# Patient Record
Sex: FEMALE | Race: WHITE | URBAN - METROPOLITAN AREA
[De-identification: names, ages, dates, MRNs, and addresses within clinical notes are randomized per-mention and may not be internally consistent; named-entity substitution may affect disease eponyms.]

---

## 2017-02-01 ENCOUNTER — TRANSFERRED RECORDS (OUTPATIENT)
Dept: HEALTH INFORMATION MANAGEMENT | Facility: CLINIC | Age: 27
End: 2017-02-01

## 2017-02-01 LAB — PAP SMEAR - HIM PATIENT REPORTED: NEGATIVE

## 2018-11-08 ENCOUNTER — OFFICE VISIT (OUTPATIENT)
Dept: FAMILY MEDICINE | Facility: CLINIC | Age: 28
End: 2018-11-08
Payer: COMMERCIAL

## 2018-11-08 VITALS
WEIGHT: 148 LBS | HEIGHT: 66 IN | HEART RATE: 97 BPM | OXYGEN SATURATION: 99 % | TEMPERATURE: 97.1 F | SYSTOLIC BLOOD PRESSURE: 124 MMHG | DIASTOLIC BLOOD PRESSURE: 70 MMHG | BODY MASS INDEX: 23.78 KG/M2

## 2018-11-08 DIAGNOSIS — L72.0 EPIDERMAL CYST: Primary | ICD-10-CM

## 2018-11-08 PROCEDURE — 99202 OFFICE O/P NEW SF 15 MIN: CPT | Performed by: FAMILY MEDICINE

## 2018-11-08 NOTE — PROGRESS NOTES
"  SUBJECTIVE:   Shelley Downs is a 28 year old female who presents to clinic today for the following health issues:      Check bumps on head  Check mole right Axillary    Patient comes in today for a initial visit.  She has a history of having multiple cysts removed from her scalp, all of which were benign.  1 of those recurred and she wanted to have that one removed plus another one has appeared.  In addition she has a mole in her right axilla that she would like removed.    She stated she was thinking about having these biopsied.  I told her this was probably something that did not need to be done and that during the removal they could assess whether this would be necessary or not.  I made no judgment about the mole and actually did not examined today.    Patient states she has no chronic medical problems    No current outpatient prescriptions on file.     She has allergies to sulfa    She states family history is benign     ROS: 10 point ROS neg other than the symptoms noted above in the HPI.      Objective: /70 (BP Location: Right arm, Patient Position: Sitting, Cuff Size: Adult Regular)  Pulse 97  Temp 97.1  F (36.2  C) (Oral)  Ht 5' 5.5\" (1.664 m)  Wt 148 lb (67.1 kg)  SpO2 99%  Breastfeeding? No  BMI 24.25 kg/m2    Patient has 2 small cysts present in her scalp    These appear well defined and nontender, with no signs of drainage or infection    As stated mole not checked      ICD-10-CM    1. Epidermal cyst L72.0        I did have Dr. Sherman come in the office to evaluate and he agreed to remove these on a different visit.  40 minutes was scheduled for removal  Reviewed and updated as needed this visit by clinical staff       Reviewed and updated as needed this visit by Provider         "

## 2018-11-08 NOTE — MR AVS SNAPSHOT
After Visit Summary   11/8/2018    Shelley Downs    MRN: [id number]           Patient Information     Date Of Birth          1990        Visit Information        Provider Department      11/8/2018 8:40 AM Arben Galvez MD [facility]        Today's Diagnoses     Epidermal cyst    -  1       Follow-ups after your visit        Your next 10 appointments already scheduled     Nov 12, 2018  9:00 AM CST   [facility] Visit with María Sherman MD   [facility]    [address] [place] [id number] [phone]           Bring a current list of meds and any records pertaining to this visit. For Physicals, please bring immunization records and any forms needing to be filled out. Please arrive 10 minutes early to complete paperwork.            Nov 16, 2018 11:00 AM CST   PHYSICAL with Lauren Anneliese Engelmann, MD   [facility]    [address] [place] [id number] [phone]              Who to contact     If you have questions or need follow up information about today's clinic visit or your schedule please contact [facility] directly at [phone].  Normal or non-critical lab and imaging results will be communicated to you by MyChart, letter or phone within 4 business days after the clinic has received the results. If you do not hear from us within 7 days, please contact the clinic through [employer] or phone. If you have a critical or abnormal lab result, we will notify you by phone as soon as possible.  Submit refill requests through [employer] or call your pharmacy and they will forward the refill request to us. Please allow 3 business days for your refill to be completed.          Additional Information About Your Visit        PR[employer] Information     [employer] lets you send messages  "to your doctor, view your test results, renew your prescriptions, schedule appointments and more. To sign up, go to [email or web address] . Click on \"Log in\" on the left side of the screen, which will take you to the Welcome page. Then click on \"Sign up Now\" on the right side of the page.     You will be asked to enter the access code listed below, as well as some personal information. Please follow the directions to create your username and password.     Your access code is: [email or web address]  Expires: 2019  9:26 AM     Your access code will  in 90 days. If you need help or a new code, please call your [place] clinic or [phone].        Care EveryWhere ID     This is your Care EveryWhere ID. This could be used by other organizations to access your [facility] medical records  [id number]        Your Vitals Were     Pulse Temperature Height Pulse Oximetry Breastfeeding? BMI (Body Mass Index)    97 97.1  F (36.2  C) (Oral) 5' 5.5\" (1.664 m) 99% No 24.25 kg/m2       Blood Pressure from Last 3 Encounters:   18 124/70    Weight from Last 3 Encounters:   18 148 lb (67.1 kg)              Today, you had the following     No orders found for display       Primary Care Provider Fax #    Physician No Ref-Primary [phone]       No address on file        Equal Access to Services     [facility] : Hadii anand ku hadasho Soomaali, waaxda luqadaha, qaybta kaalmada adeegyada, emery dai . So [facility] [phone].    ATENCIÓN: Si habla español, tiene a duckworth disposición servicios gratuitos de asistencia lingüística. Llame al [phone].    We comply with applicable federal civil rights laws and [place] laws. We do not discriminate on the basis of race, color, national origin, age, disability, sex, sexual orientation, or gender identity.            Thank you!     Thank you for choosing [facility]  for your care. Our goal is always to provide you with " excellent care. Hearing back from our patients is one way we can continue to improve our services. Please take a few minutes to complete the written survey that you may receive in the mail after your visit with us. Thank you!             Your Updated Medication List - Protect others around you: Learn how to safely use, store and throw away your medicines at www.disposemymeds.org.      Notice  As of 11/8/2018  9:26 AM    You have not been prescribed any medications.

## 2018-11-15 ENCOUNTER — OFFICE VISIT (OUTPATIENT)
Dept: FAMILY MEDICINE | Facility: CLINIC | Age: 28
End: 2018-11-15
Payer: COMMERCIAL

## 2018-11-15 VITALS
SYSTOLIC BLOOD PRESSURE: 102 MMHG | DIASTOLIC BLOOD PRESSURE: 64 MMHG | BODY MASS INDEX: 24.58 KG/M2 | HEART RATE: 76 BPM | TEMPERATURE: 97.3 F | WEIGHT: 150 LBS | OXYGEN SATURATION: 100 %

## 2018-11-15 DIAGNOSIS — M67.40 GANGLION CYST: Primary | ICD-10-CM

## 2018-11-15 PROCEDURE — 11420 EXC H-F-NK-SP B9+MARG 0.5/<: CPT | Performed by: FAMILY MEDICINE

## 2018-11-15 ASSESSMENT — PAIN SCALES - GENERAL: PAINLEVEL: NO PAIN (0)

## 2018-11-15 NOTE — PROGRESS NOTES
SUBJECTIVE:                                                    Shelley Downs is a 28 year old female who presents to clinic today for the following health issues:  Patient is here for cyst removal  Patient comes in today to have a benign cyst removed.  She reports been having for a few months now and has been getting larger in size.   It's at the right occipital area.  She reports sometimes it does interfere with washing and combing her hair.    She has no drainage and is not tender.  She reports she had a few removed in the past    Problem list and histories reviewed & adjusted, as indicated.  Additional history: as documented    There is no problem list on file for this patient.    History reviewed. No pertinent surgical history.    Social History   Substance Use Topics     Smoking status: Former Smoker     Smokeless tobacco: Never Used     Alcohol use Yes      Comment: Occas     History reviewed. No pertinent family history.      No current outpatient prescriptions on file.       ROS:  Constitutional, HEENT, cardiovascular, pulmonary, gi and gu systems are negative, except as otherwise noted.    OBJECTIVE:     /64 (BP Location: Right arm, Patient Position: Chair, Cuff Size: Adult Regular)  Pulse 76  Temp 97.3  F (36.3  C) (Oral)  Wt 150 lb (68 kg)  SpO2 100%  Breastfeeding? No  BMI 24.58 kg/m2  Body mass index is 24.58 kg/(m^2).  GENERAL: healthy, alert and no distress  SKIN: right occipital area, 3 mm by 6 mm of beingin cyst.    Diagnostic Test Results:  none     ASSESSMENT/PLAN:       ICD-10-CM    1. Ganglion cyst M67.40 EXC BENIGN SKIN LESION SCLP/NCK/HNDS/FEET/GEN <=0.5 CM     CANCELED: EXC BENIGN SKIN LESION SCLP/NCK/HNDS/FEET/GEN 1.1-2.0 CM     After a explained the procedure for the patient.  And I obtained written and signed consent.    The area of the area of the cyst was cleaned initially with alcohol swabs and Betadine 3 times and alcohol again.  I did use sterile technique.  And I  did numb the area using 3 cc of 1% lidocaine.    Then I used scalpel and made a horizontal incision.  Used a needle handler with a tweezers and I was able to remove the cyst completely with the complete sac intact.  Afterwards I did use 1 stitch, 4 - ethlion stitch.   There was minimum bleeding.    She was given instruction to keep it dry and clean.  She will follow-up in 1 week to have the stitch removed  Patient Instructions   After care instructions:  Keep wound clean and dry for the next 24-48 hours  Signs of infection discussed today  Apply anti-bacterial ointment for 5- 7 days, 2-3X per day.  May return to work as long as wound is kept clean and dry  Discussed the probability of scarring  Active range of motion exercises encouraged.      Follow up in one week        María Sherman MD  [facility]

## 2018-11-15 NOTE — PATIENT INSTRUCTIONS
After care instructions:  Keep wound clean and dry for the next 24-48 hours  Signs of infection discussed today  Apply anti-bacterial ointment for 5- 7 days, 2-3X per day.  May return to work as long as wound is kept clean and dry  Discussed the probability of scarring  Active range of motion exercises encouraged.      Follow up in one week

## 2018-11-23 ENCOUNTER — OFFICE VISIT (OUTPATIENT)
Dept: FAMILY MEDICINE | Facility: CLINIC | Age: 28
End: 2018-11-23
Payer: COMMERCIAL

## 2018-11-23 VITALS
HEART RATE: 88 BPM | DIASTOLIC BLOOD PRESSURE: 76 MMHG | BODY MASS INDEX: 24.09 KG/M2 | WEIGHT: 147 LBS | OXYGEN SATURATION: 100 % | TEMPERATURE: 97.2 F | SYSTOLIC BLOOD PRESSURE: 115 MMHG

## 2018-11-23 DIAGNOSIS — Z48.02 VISIT FOR SUTURE REMOVAL: Primary | ICD-10-CM

## 2018-11-23 PROCEDURE — 99024 POSTOP FOLLOW-UP VISIT: CPT | Performed by: FAMILY MEDICINE

## 2018-11-23 ASSESSMENT — PAIN SCALES - GENERAL: PAINLEVEL: NO PAIN (0)

## 2018-11-23 NOTE — PROGRESS NOTES
SUBJECTIVE:                                                    Shelley Downs is a 28 year old female who presents to clinic today for the following health issues:  She had a cyst removed, last week, she is her to have one stitch remove. She report doing well, no discharge or bleeding and no pian.    Patient is here for a suture removal    Problem list and histories reviewed & adjusted, as indicated.  Additional history: as documented    There is no problem list on file for this patient.    History reviewed. No pertinent surgical history.    Social History   Substance Use Topics     Smoking status: Former Smoker     Smokeless tobacco: Never Used     Alcohol use Yes      Comment: Occas     History reviewed. No pertinent family history.        ROS:  Constitutional, HEENT, cardiovascular, pulmonary, gi and gu systems are negative, except as otherwise noted.    OBJECTIVE:     /76 (BP Location: Right arm, Patient Position: Chair, Cuff Size: Adult Regular)  Pulse 88  Temp 97.2  F (36.2  C) (Oral)  Wt 147 lb (66.7 kg)  SpO2 100%  Breastfeeding? No  BMI 24.09 kg/m2  Body mass index is 24.09 kg/(m^2).  GENERAL: healthy, alert and no distress  Well healing wound, right sided occipital area, on stitch in good place. No discharges or bleeding  Diagnostic Test Results:  none     ASSESSMENT/PLAN:       ICD-10-CM    1. Visit for suture removal Z48.02      After I obtain, verbal consent form pt. I was able to remove on stitch. Wound heeling well.  See Patient Instructions    María Sherman MD  [facility]

## 2018-11-23 NOTE — MR AVS SNAPSHOT
"              After Visit Summary   11/23/2018    Shelley Downs    MRN: [id number]           Patient Information     Date Of Birth          1990        Visit Information        Provider Department      11/23/2018 9:00 AM María Sherman MD [facility]        Today's Diagnoses     Visit for suture removal    -  1       Follow-ups after your visit        Your next 10 appointments already scheduled     Nov 26, 2018  8:20 AM CST   PHYSICAL with Ivonne Pal PA-C   [facility]    [address] [place] [id number] [phone]              Who to contact     If you have questions or need follow up information about today's clinic visit or your schedule please contact [facility] directly at [phone].  Normal or non-critical lab and imaging results will be communicated to you by MyChart, letter or phone within 4 business days after the clinic has received the results. If you do not hear from us within 7 days, please contact the clinic through MyChart or phone. If you have a critical or abnormal lab result, we will notify you by phone as soon as possible.  Submit refill requests through [employer] or call your pharmacy and they will forward the refill request to us. Please allow 3 business days for your refill to be completed.          Additional Information About Your Visit        MyChart Information     [employer] lets you send messages to your doctor, view your test results, renew your prescriptions, schedule appointments and more. To sign up, go to [email or web address] . Click on \"Log in\" on the left side of the screen, which will take you to the Welcome page. Then click on \"Sign up Now\" on the right side of the page.     You will be asked to enter the access code listed below, as well as some personal information. Please follow the directions to create your username " and password.     Your access code is: [email or web address]  Expires: 2019  9:26 AM     Your access code will  in 90 days. If you need help or a new code, please call your [place] clinic or [phone].        Care EveryWhere ID     This is your Care EveryWhere ID. This could be used by other organizations to access your [place] medical records  [id number]        Your Vitals Were     Pulse Temperature Pulse Oximetry Breastfeeding? BMI (Body Mass Index)       88 97.2  F (36.2  C) (Oral) 100% No 24.09 kg/m2        Blood Pressure from Last 3 Encounters:   18 115/76   11/15/18 102/64   18 124/70    Weight from Last 3 Encounters:   18 147 lb (66.7 kg)   11/15/18 150 lb (68 kg)   18 148 lb (67.1 kg)              Today, you had the following     No orders found for display       Primary Care Provider Fax #    Physician No Ref-Primary [phone]       No address on file        Equal Access to Services     [facility]: Hadii anand Gonzalez, waaxda luhaileyadaha, qaybta kaalmakell meade, emery dai . So [facility] [phone].    ATENCIÓN: Si habla español, tiene a duckworth disposición servicios gratuitos de asistencia lingüística. Llame al [phone].    We comply with applicable federal civil rights laws and [place] laws. We do not discriminate on the basis of race, color, national origin, age, disability, sex, sexual orientation, or gender identity.            Thank you!     Thank you for choosing [facility]  for your care. Our goal is always to provide you with excellent care. Hearing back from our patients is one way we can continue to improve our services. Please take a few minutes to complete the written survey that you may receive in the mail after your visit with us. Thank you!             Your Updated Medication List - Protect others around you: Learn how to safely use, store and throw away your medicines at www.disposemymeds.org.       Notice  As of 11/23/2018 10:16 AM    You have not been prescribed any medications.

## 2018-11-26 ENCOUNTER — OFFICE VISIT (OUTPATIENT)
Dept: FAMILY MEDICINE | Facility: CLINIC | Age: 28
End: 2018-11-26
Payer: COMMERCIAL

## 2018-11-26 VITALS
SYSTOLIC BLOOD PRESSURE: 118 MMHG | HEART RATE: 85 BPM | BODY MASS INDEX: 24.58 KG/M2 | TEMPERATURE: 98.1 F | WEIGHT: 150 LBS | OXYGEN SATURATION: 98 % | DIASTOLIC BLOOD PRESSURE: 79 MMHG

## 2018-11-26 DIAGNOSIS — J02.0 STREP THROAT: ICD-10-CM

## 2018-11-26 DIAGNOSIS — F41.9 ANXIETY: ICD-10-CM

## 2018-11-26 DIAGNOSIS — J02.9 SORE THROAT: ICD-10-CM

## 2018-11-26 DIAGNOSIS — Z00.00 ROUTINE GENERAL MEDICAL EXAMINATION AT A HEALTH CARE FACILITY: Primary | ICD-10-CM

## 2018-11-26 DIAGNOSIS — Z23 NEED FOR PROPHYLACTIC VACCINATION AND INOCULATION AGAINST INFLUENZA: ICD-10-CM

## 2018-11-26 LAB
DEPRECATED S PYO AG THROAT QL EIA: ABNORMAL
SPECIMEN SOURCE: ABNORMAL

## 2018-11-26 PROCEDURE — 99214 OFFICE O/P EST MOD 30 MIN: CPT | Mod: 25 | Performed by: PHYSICIAN ASSISTANT

## 2018-11-26 PROCEDURE — 99395 PREV VISIT EST AGE 18-39: CPT | Mod: 25 | Performed by: PHYSICIAN ASSISTANT

## 2018-11-26 PROCEDURE — 87880 STREP A ASSAY W/OPTIC: CPT | Performed by: PHYSICIAN ASSISTANT

## 2018-11-26 PROCEDURE — 90686 IIV4 VACC NO PRSV 0.5 ML IM: CPT | Performed by: PHYSICIAN ASSISTANT

## 2018-11-26 PROCEDURE — 90471 IMMUNIZATION ADMIN: CPT | Performed by: PHYSICIAN ASSISTANT

## 2018-11-26 RX ORDER — AMOXICILLIN 500 MG/1
500 CAPSULE ORAL 3 TIMES DAILY
Qty: 30 CAPSULE | Refills: 0 | Status: SHIPPED | OUTPATIENT
Start: 2018-11-26 | End: 2018-12-12

## 2018-11-26 RX ORDER — NORETHINDRONE ACETATE AND ETHINYL ESTRADIOL .02; 1 MG/1; MG/1
1 TABLET ORAL DAILY
Qty: 84 TABLET | Refills: 3 | Status: SHIPPED | OUTPATIENT
Start: 2018-11-26 | End: 2019-12-04

## 2018-11-26 RX ORDER — NORETHINDRONE ACETATE AND ETHINYL ESTRADIOL .02; 1 MG/1; MG/1
1 TABLET ORAL DAILY
COMMUNITY
End: 2018-11-26

## 2018-11-26 RX ORDER — CITALOPRAM HYDROBROMIDE 20 MG/1
TABLET ORAL
Qty: 30 TABLET | Refills: 0 | Status: SHIPPED | OUTPATIENT
Start: 2018-11-26 | End: 2018-12-12

## 2018-11-26 ASSESSMENT — ENCOUNTER SYMPTOMS
FEVER: 0
SORE THROAT: 0
NERVOUS/ANXIOUS: 1
COUGH: 0
SHORTNESS OF BREATH: 0
DYSURIA: 0
HEARTBURN: 0
FREQUENCY: 0
DIZZINESS: 0
DIARRHEA: 0
NAUSEA: 0
CHILLS: 0
HEMATOCHEZIA: 0
JOINT SWELLING: 0
HEMATURIA: 0
HEADACHES: 1
WEAKNESS: 0
PALPITATIONS: 0
ABDOMINAL PAIN: 0
PARESTHESIAS: 0
BREAST MASS: 0
CONSTIPATION: 0

## 2018-11-26 ASSESSMENT — ANXIETY QUESTIONNAIRES
6. BECOMING EASILY ANNOYED OR IRRITABLE: NOT AT ALL
5. BEING SO RESTLESS THAT IT IS HARD TO SIT STILL: NOT AT ALL
2. NOT BEING ABLE TO STOP OR CONTROL WORRYING: SEVERAL DAYS
7. FEELING AFRAID AS IF SOMETHING AWFUL MIGHT HAPPEN: NOT AT ALL
3. WORRYING TOO MUCH ABOUT DIFFERENT THINGS: SEVERAL DAYS
GAD7 TOTAL SCORE: 4
1. FEELING NERVOUS, ANXIOUS, OR ON EDGE: SEVERAL DAYS

## 2018-11-26 ASSESSMENT — PATIENT HEALTH QUESTIONNAIRE - PHQ9
SUM OF ALL RESPONSES TO PHQ QUESTIONS 1-9: 6
5. POOR APPETITE OR OVEREATING: SEVERAL DAYS

## 2018-11-26 NOTE — LETTER
November 26, 2018      Shelley Downs  [address]   [place] [address]        To Whom It May Concern,      Please excuse Shelley from work today 11/26/18 due to illness.            Sincerely,        Ivonne Pal PA-C
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
## 2018-11-26 NOTE — PROGRESS NOTES
Injectable Influenza Immunization Documentation    1.  Is the person to be vaccinated sick today?   No    2. Does the person to be vaccinated have an allergy to a component   of the vaccine?   No  Egg Allergy Algorithm Link    3. Has the person to be vaccinated ever had a serious reaction   to influenza vaccine in the past?   No    4. Has the person to be vaccinated ever had Guillain-Barré syndrome?   No    Form completed by Kevin Oviedo MA  Due to injection administration, patient instructed to remain in clinic for 15 minutes  afterwards, and to report any adverse reaction to me immediately.

## 2018-11-26 NOTE — PATIENT INSTRUCTIONS
Dr. Carlin at [facility] -when you schedule tell them for weight loss   Preventive Health Recommendations  Female Ages 26 - 39  Yearly exam:   See your health care provider every year in order to    Review health changes.     Discuss preventive care.      Review your medicines if you your doctor has prescribed any.    Until age 30: Get a Pap test every three years (more often if you have had an abnormal result).    After age 30: Talk to your doctor about whether you should have a Pap test every 3 years or have a Pap test with HPV screening every 5 years.   You do not need a Pap test if your uterus was removed (hysterectomy) and you have not had cancer.  You should be tested each year for STDs (sexually transmitted diseases), if you're at risk.   Talk to your provider about how often to have your cholesterol checked.  If you are at risk for diabetes, you should have a diabetes test (fasting glucose).  Shots: Get a flu shot each year. Get a tetanus shot every 10 years.   Nutrition:     Eat at least 5 servings of fruits and vegetables each day.    Eat whole-grain bread, whole-wheat pasta and brown rice instead of white grains and rice.    Get adequate Calcium and Vitamin D.     Lifestyle    Exercise at least 150 minutes a week (30 minutes a day, 5 days of the week). This will help you control your weight and prevent disease.    Limit alcohol to one drink per day.    No smoking.     Wear sunscreen to prevent skin cancer.    See your dentist every six months for an exam and cleaning.

## 2018-11-26 NOTE — MR AVS SNAPSHOT
After Visit Summary   11/26/2018    Shelley Downs    MRN: [id number]           Patient Information     Date Of Birth          1990        Visit Information        Provider Department      11/26/2018 8:20 AM Ivonne Pal PA-C [facility]        Today's Diagnoses     Routine general medical examination at a health care facility    -  1    Sore throat        Anxiety        Need for prophylactic vaccination and inoculation against influenza        Strep throat          Care Instructions    Dr. Tesfaye carson [place] -when you schedule tell them for weight loss   Preventive Health Recommendations  Female Ages 26 - 39  Yearly exam:   See your health care provider every year in order to    Review health changes.     Discuss preventive care.      Review your medicines if you your doctor has prescribed any.    Until age 30: Get a Pap test every three years (more often if you have had an abnormal result).    After age 30: Talk to your doctor about whether you should have a Pap test every 3 years or have a Pap test with HPV screening every 5 years.   You do not need a Pap test if your uterus was removed (hysterectomy) and you have not had cancer.  You should be tested each year for STDs (sexually transmitted diseases), if you're at risk.   Talk to your provider about how often to have your cholesterol checked.  If you are at risk for diabetes, you should have a diabetes test (fasting glucose).  Shots: Get a flu shot each year. Get a tetanus shot every 10 years.   Nutrition:     Eat at least 5 servings of fruits and vegetables each day.    Eat whole-grain bread, whole-wheat pasta and brown rice instead of white grains and rice.    Get adequate Calcium and Vitamin D.     Lifestyle    Exercise at least 150 minutes a week (30 minutes a day, 5 days of the week). This will help you control your weight and prevent disease.    Limit alcohol to one drink per day.    No smoking.     Wear  "sunscreen to prevent skin cancer.    See your dentist every six months for an exam and cleaning.            Follow-ups after your visit        Follow-up notes from your care team     Return in about 2 weeks (around 12/10/2018) for mood.      Who to contact     If you have questions or need follow up information about today's clinic visit or your schedule please contact [facility] directly at [phone].  Normal or non-critical lab and imaging results will be communicated to you by Entomohart, letter or phone within 4 business days after the clinic has received the results. If you do not hear from us within 7 days, please contact the clinic through Entomohart or phone. If you have a critical or abnormal lab result, we will notify you by phone as soon as possible.  Submit refill requests through Benhauer or call your pharmacy and they will forward the refill request to us. Please allow 3 business days for your refill to be completed.          Additional Information About Your Visit        EntomoharJellyCloud Information     Benhauer lets you send messages to your doctor, view your test results, renew your prescriptions, schedule appointments and more. To sign up, go to [email or web address] . Click on \"Log in\" on the left side of the screen, which will take you to the Welcome page. Then click on \"Sign up Now\" on the right side of the page.     You will be asked to enter the access code listed below, as well as some personal information. Please follow the directions to create your username and password.     Your access code is: [email or web address]  Expires: 2019  9:26 AM     Your access code will  in 90 days. If you need help or a new code, please call your [facility] clinic or [phone].        Care EveryWhere ID     This is your Care EveryWhere ID. This could be used by other organizations to access your [facility] medical records  [id number]        Your Vitals Were     Pulse Temperature Pulse Oximetry BMI " (Body Mass Index)          85 98.1  F (36.7  C) (Oral) 98% 24.58 kg/m2         Blood Pressure from Last 3 Encounters:   11/26/18 118/79   11/23/18 115/76   11/15/18 102/64    Weight from Last 3 Encounters:   11/26/18 150 lb (68 kg)   11/23/18 147 lb (66.7 kg)   11/15/18 150 lb (68 kg)              We Performed the Following     Rapid strep screen          Today's Medication Changes          These changes are accurate as of 11/26/18  9:12 AM.  If you have any questions, ask your nurse or doctor.               Start taking these medicines.        Dose/Directions    amoxicillin 500 MG capsule   Commonly known as:  AMOXIL   Used for:  Strep throat   Started by:  Ivonne Pal PA-C        Dose:  500 mg   Take 1 capsule (500 mg) by mouth 3 times daily   Quantity:  30 capsule   Refills:  0       citalopram 20 MG tablet   Commonly known as:  celeXA   Used for:  Anxiety   Started by:  Ivonne Pal PA-C        Take 1/2 tablet (10 mg) for 1-2 weeks, then increase to 1 tablet orally daily   Quantity:  30 tablet   Refills:  0            Where to get your medicines      These medications were sent to [facility] [id number] - [place], [place] - [address] AT [facility] [address]  [address], [place] [address]     Phone:  [phone]     amoxicillin 500 MG capsule    citalopram 20 MG tablet                Primary Care Provider Fax #    Physician No Ref-Primary [phone]       No address on file        Equal Access to Services     BRIDGER ESPINOZA : Hadii anand maddoxo Soeddali, waaxda luqadaha, qaybta kaalmada adeegyada, emery viveros. So [facility] [phone].    ATENCIÓN: Si habla español, tiene a duckworth disposición servicios gratuitos de asistencia lingüística. Llame al [phone].    We comply with applicable federal civil rights laws and [place] laws. We do not discriminate on the basis of race, color, national origin, age, disability,  sex, sexual orientation, or gender identity.            Thank you!     Thank you for choosing [facility]  for your care. Our goal is always to provide you with excellent care. Hearing back from our patients is one way we can continue to improve our services. Please take a few minutes to complete the written survey that you may receive in the mail after your visit with us. Thank you!             Your Updated Medication List - Protect others around you: Learn how to safely use, store and throw away your medicines at www.disposemymeds.org.          This list is accurate as of 11/26/18  9:12 AM.  Always use your most recent med list.                   Brand Name Dispense Instructions for use Diagnosis    amoxicillin 500 MG capsule    AMOXIL    30 capsule    Take 1 capsule (500 mg) by mouth 3 times daily    Strep throat       citalopram 20 MG tablet    celeXA    30 tablet    Take 1/2 tablet (10 mg) for 1-2 weeks, then increase to 1 tablet orally daily    Anxiety       COLLAGEN EX           UNKNOWN TO PATIENT      BC pills

## 2018-11-27 ASSESSMENT — ANXIETY QUESTIONNAIRES: GAD7 TOTAL SCORE: 4

## 2018-12-12 ENCOUNTER — OFFICE VISIT (OUTPATIENT)
Dept: FAMILY MEDICINE | Facility: CLINIC | Age: 28
End: 2018-12-12
Payer: COMMERCIAL

## 2018-12-12 VITALS
HEART RATE: 89 BPM | SYSTOLIC BLOOD PRESSURE: 110 MMHG | DIASTOLIC BLOOD PRESSURE: 74 MMHG | WEIGHT: 149 LBS | BODY MASS INDEX: 24.42 KG/M2 | TEMPERATURE: 98.6 F | OXYGEN SATURATION: 98 %

## 2018-12-12 DIAGNOSIS — R53.83 FATIGUE, UNSPECIFIED TYPE: ICD-10-CM

## 2018-12-12 DIAGNOSIS — R07.0 THROAT PAIN: Primary | ICD-10-CM

## 2018-12-12 DIAGNOSIS — F41.9 ANXIETY: ICD-10-CM

## 2018-12-12 LAB
DEPRECATED S PYO AG THROAT QL EIA: NORMAL
HETEROPH AB SER QL: NEGATIVE
SPECIMEN SOURCE: NORMAL

## 2018-12-12 PROCEDURE — 36415 COLL VENOUS BLD VENIPUNCTURE: CPT | Performed by: PHYSICIAN ASSISTANT

## 2018-12-12 PROCEDURE — 86308 HETEROPHILE ANTIBODY SCREEN: CPT | Performed by: PHYSICIAN ASSISTANT

## 2018-12-12 PROCEDURE — 87081 CULTURE SCREEN ONLY: CPT | Performed by: PHYSICIAN ASSISTANT

## 2018-12-12 PROCEDURE — 99213 OFFICE O/P EST LOW 20 MIN: CPT | Performed by: PHYSICIAN ASSISTANT

## 2018-12-12 PROCEDURE — 87880 STREP A ASSAY W/OPTIC: CPT | Performed by: PHYSICIAN ASSISTANT

## 2018-12-12 RX ORDER — CITALOPRAM HYDROBROMIDE 20 MG/1
20 TABLET ORAL DAILY
Qty: 30 TABLET | Refills: 1 | Status: SHIPPED | OUTPATIENT
Start: 2018-12-12 | End: 2019-01-14

## 2018-12-12 NOTE — PROGRESS NOTES
SUBJECTIVE:   Shelley Downs is a 28 year old female who presents to clinic today for the following health issues:      Medication Followup of Citalopram     Taking Medication as prescribed: yes    Side Effects:  None    Medication Helping Symptoms:  Unsure        Sore throat x 2 days-was on amoxicillin for 10 days and has been off of it for 4 days and now her throat hurts again     Really tired.            Problem list and histories reviewed & adjusted, as indicated.  Additional history: as documented    Patient Active Problem List   Diagnosis     Anxiety     Past Surgical History:   Procedure Laterality Date     LASER SURGERY OF EYE  2014       Social History     Tobacco Use     Smoking status: Former Smoker     Smokeless tobacco: Never Used   Substance Use Topics     Alcohol use: Yes     Comment: Occas     No family history on file.        Reviewed and updated as needed this visit by clinical staff  Tobacco  Allergies  Meds       Reviewed and updated as needed this visit by Provider         ROS:  As above    OBJECTIVE:     /74 (BP Location: Right arm, Patient Position: Sitting, Cuff Size: Adult Regular)   Pulse 89   Temp 98.6  F (37  C) (Oral)   Wt 67.6 kg (149 lb)   SpO2 98%   BMI 24.42 kg/m    Body mass index is 24.42 kg/m .  GENERAL: healthy, alert and no distress  HENT: ear canals and TM's normal, oropharynx clear and oral mucous membranes moist  NECK: no adenopathy and no asymmetry, masses, or scars  RESP: lungs clear to auscultation - no rales, rhonchi or wheezes  CV: regular rates and rhythm, normal S1 S2, no S3 or S4 and no murmur, click or rub  ABDOMEN: soft, nontender, no hepatosplenomegaly, no masses and bowel sounds normal  PSYCH: mentation appears normal, affect normal/bright    Diagnostic Test Results:  Results for orders placed or performed in visit on 12/12/18 (from the past 24 hour(s))   Strep, Rapid Screen   Result Value Ref Range    Specimen Description Throat     Rapid Strep  A Screen       NEGATIVE: No Group A streptococcal antigen detected by immunoassay, await culture report.   Mononucleosis screen   Result Value Ref Range    Mononucleosis Screen Negative NEG^Negative       ASSESSMENT/PLAN:       1. Throat pain  Likely viral.  Monitor for now  - Strep, Rapid Screen  - Beta strep group A culture  - Mononucleosis screen    2. Fatigue, unspecified type  As above  - Mononucleosis screen    3. Anxiety  For now no changes.  Continue and follow up in one month   - citalopram (CELEXA) 20 MG tablet; Take 1 tablet (20 mg) by mouth daily  Dispense: 30 tablet; Refill: 1        Ivonne Pal PA-C  [facility]

## 2018-12-13 ENCOUNTER — TELEPHONE (OUTPATIENT)
Dept: FAMILY MEDICINE | Facility: CLINIC | Age: 28
End: 2018-12-13

## 2018-12-13 LAB
BACTERIA SPEC CULT: NORMAL
SPECIMEN SOURCE: NORMAL

## 2019-01-14 ENCOUNTER — OFFICE VISIT (OUTPATIENT)
Dept: INTERNAL MEDICINE | Facility: CLINIC | Age: 29
End: 2019-01-14
Payer: COMMERCIAL

## 2019-01-14 VITALS
TEMPERATURE: 98.1 F | WEIGHT: 149 LBS | RESPIRATION RATE: 18 BRPM | BODY MASS INDEX: 23.95 KG/M2 | DIASTOLIC BLOOD PRESSURE: 82 MMHG | HEIGHT: 66 IN | OXYGEN SATURATION: 99 % | SYSTOLIC BLOOD PRESSURE: 104 MMHG | HEART RATE: 102 BPM

## 2019-01-14 DIAGNOSIS — R63.5 ABNORMAL WEIGHT GAIN: Primary | ICD-10-CM

## 2019-01-14 LAB — TSH SERPL DL<=0.005 MIU/L-ACNC: 1.04 MU/L (ref 0.4–4)

## 2019-01-14 PROCEDURE — 84443 ASSAY THYROID STIM HORMONE: CPT | Performed by: INTERNAL MEDICINE

## 2019-01-14 PROCEDURE — 36415 COLL VENOUS BLD VENIPUNCTURE: CPT | Performed by: INTERNAL MEDICINE

## 2019-01-14 PROCEDURE — 99214 OFFICE O/P EST MOD 30 MIN: CPT | Performed by: INTERNAL MEDICINE

## 2019-01-14 PROCEDURE — 82627 DEHYDROEPIANDROSTERONE: CPT | Performed by: INTERNAL MEDICINE

## 2019-01-14 ASSESSMENT — PAIN SCALES - GENERAL: PAINLEVEL: NO PAIN (0)

## 2019-01-14 ASSESSMENT — MIFFLIN-ST. JEOR: SCORE: 1414.67

## 2019-01-14 NOTE — LETTER
[facility]  [address] [place] [address]    January 16, 2019    Shelley Downs  [address]   [place] [address]          Dear Shelley,    Normal DHEAS level    Enclosed is a copy of your results.     Results for orders placed or performed in visit on 01/14/19   DHEA sulfate   Result Value Ref Range    DHEA Sulfate 303 35 - 430 ug/dL   TSH with free T4 reflex   Result Value Ref Range    TSH 1.04 0.40 - 4.00 mU/L       If you have any questions or concerns, please call myself or my nurse at [phone].      Sincerely,        Mel Carlin MD/loida

## 2019-01-14 NOTE — PATIENT INSTRUCTIONS
You do not qualify for phentermine or weight loss medication.  Protein at 80 grams per day.  American Academy of Sports Medicine 7 Minute workout if you can't do HIIT/strength training.  Today you are at 36% body fat and I would like you under 33% (under 30 would be even better).  You can schedule a nurse only appointment in 1 month to recheck this.      [facility]    If you have any questions regarding to your visit please contact your care team:     Team Pink:   Clinic Hours Telephone Number   Internal Medicine:  Dr. Mel Blanton, NP 7am-7pm  Monday - Thursday   7am-5pm  Fridays  [phone]  (Appointment scheduling available 24/7)   Urgent Care - [place] and [facility] - 11am-9pm Monday-Friday Saturday-Sunday- 9am-5pm   [place] - 5pm-9pm Monday-Friday Saturday-Sunday- 9am-5pm  [phone] - [place]  [phone] - [place]       What options do I have for a visit other than an office visit? We offer electronic visits (e-visits) and telephone visits, when medically appropriate.  Please check with your medical insurance to see if these types of visits are covered, as you will be responsible for any charges that are not paid by your insurance.      You can use [employer] (secure electronic communication) to access to your chart, send your primary care provider a message, or make an appointment. Ask a team member how to get started.     For a price quote for your services, please call our Consumer Price Line at [phone] or our Imaging Cost estimation line at [phone] (for imaging tests).    Gracie Cheng, CMA

## 2019-01-14 NOTE — PROGRESS NOTES
INTERNAL MEDICINE  Medical Weight Loss - Initial Evaluation      Primary Care Physician: Ivonne Pal    Chief Complaint:   Chief Complaint   Patient presents with     Weight Problem     consult.       Wt Readings from Last 5 Encounters:   01/14/19 67.6 kg (149 lb)   12/12/18 67.6 kg (149 lb)   11/26/18 68 kg (150 lb)   11/23/18 66.7 kg (147 lb)   11/15/18 68 kg (150 lb)        HISTORY OF PRESENT ILLNESS (HPI):    Patient is 28 year old year old female who is here for medical weight loss initial evaluation.   The patient has had problems with her weight, starting at the age of  20's.  It is worsened by stress.      History:    She has a toddler and was able to lose weight after her pregnancy.  She  and this helped.      She was on pehntermine for 3-4 months in the past and lost 15 pounds.  She has done lifestyle programs in the past for eating and exercise.  Did her second round of phentermine in 4/2018 and lost 15 pounds.  Was able to keep it off for months and then her old habits came back.      She was on citalopram due to stress but stopped due to fatigue.     History of infertility, PCOS and mild DHEAS elevation.  Follow up with a doctor in [facility] who said it was fine.      She has normal bowel movements and takes a probiotic.    Diet:  Will eat salmon.  Otherwise is a vegetarian and tries to eat lower carb..  Uses collagen powder for protein as well.  Quinoa salad at work with greens  She has chronic bloating and feels uncomfortable as well.  She doesn't really eat sweets.        Exercise Habits: she is a  so she doesn't exercise.  She is a single parent.  She likes to go to classes.  She enjoys baby yoga classes with her toddler.      Patient reports a pattern of gradual weight gain, with worsening of weight gain due to Stress.      She desires to lose weight to Improve self worth.    Lowest adult weight was 110 lbs.  Highest adult weight was 149 lbs 0 oz   lbs.   Patient feels her  goal weight is 130 lbs.     Previous weight loss efforts: OTHER: pescatarian       Patient has not had weight loss surgery.  Patient has not considered weight loss surgery.  Patient has tried weight loss medication - phentermine unknown dose .  Patient has  Considered weight loss medication.    OBESITY RELATED COMORBIDITIES:   pcos    PAST SURGICAL HISTORY:   Past Surgical History:   Procedure Laterality Date     LASER SURGERY OF EYE  2014       PAST MEDICAL HISTORY:  History reviewed. No pertinent past medical history.    MEDICATIONS:   Current Outpatient Medications   Medication Sig Dispense Refill     Emollient (COLLAGEN EX)        norethindrone-ethinyl estradiol (JUNEL 1/20) 1-20 MG-MCG per tablet Take 1 tablet by mouth daily 84 tablet 3       ALLERGIES:   Allergies   Allergen Reactions     Bactrim [Sulfamethoxazole W/Trimethoprim]      Sulfamethoxazole-Trimethoprim Hives, Itching and Rash     Other reaction(s): Angioedema       SOCIAL HISTORY:   Social History     Socioeconomic History     Marital status:      Spouse name: Not on file     Number of children: Not on file     Years of education: Not on file     Highest education level: Not on file   Social Needs     Financial resource strain: Not on file     Food insecurity - worry: Not on file     Food insecurity - inability: Not on file     Transportation needs - medical: Not on file     Transportation needs - non-medical: Not on file   Occupational History     Not on file   Tobacco Use     Smoking status: Former Smoker     Smokeless tobacco: Never Used   Substance and Sexual Activity     Alcohol use: Yes     Comment: Occas     Drug use: No     Sexual activity: Yes     Partners: Male     Birth control/protection: Pill   Other Topics Concern     Parent/sibling w/ CABG, MI or angioplasty before 65F 55M? Not Asked   Social History Narrative     Not on file       FAMILY HISTORY:   History reviewed. No pertinent family history.    REVIEW OF SYSTEMS:   ROS: 10  "point ROS neg other than the symptoms noted above in the HPI.   PSYCH: NEGATIVE for  anxiety or depression,  panic attacks or suicide attempts, emotional eating, binge eating, or h/o eating disorder or eating disorder treatment. Denies abuse.     PHYSICAL EXAMINATION:    VITALS: /82 (BP Location: Left arm, Cuff Size: Adult Regular)   Pulse 102   Temp 98.1  F (36.7  C) (Oral)   Resp 18   Ht 1.664 m (5' 5.5\")   Wt 67.6 kg (149 lb)   LMP 03/14/2018 (Approximate)   SpO2 99%   Breastfeeding? No   BMI 24.42 kg/m    GENERAL: Patient is a 28 year old year old female is in no acute distress.  Patient is alert and orientated x 4, pleasant and cooperative with exam. Mood and affect are appropriate.  HEENT:  No conjunctival injection or icterus. Dentition WNL.  NECK: is supple without lymphadenopathy, thyroidmegaly, or mass.    CARDIOVASCULAR:  Regular rate and rhythm without murmurs, rubs, or gallops.  RESPIRATORY:  Lungs are clear to auscultation bilaterally, respiratory effort is normal.   GASTROINTESTINAL:  Abdomen is obese, soft, nontender, without obvious organomegaly or masses.  Positive bowel sounds throughout. No bruits.  LOWER EXTREMITIES:  No edema, cyanosis, ulceration, or chronic venous stasis noted bilaterally.  MUSCULOSKELETAL:  Moves all 4 extremities equal and strong    NEUROLOGIC:  Cranial nerves II-XII grossly intact. Gait appears normal.   SKIN:  No intertiginous irritation or rash.    PSYCH:  Mentation appears normal, affect normal/ bright        LAB RESULTS:   Reviewed in Epic    ASSESSMENT/PLAN:    1. Abnormal weight gain  She doesn't qualify for weight loss medications.  Discussed with patient diet and exercise.  Per patient instructions.   - DHEA sulfate  - TSH with free T4 reflex       Patient Instructions     You do not qualify for phentermine or weight loss medication.  Protein at 80 grams per day.  American Academy of Sports Medicine 7 Minute workout if you can't do HIIT/strength " training.  Today you are at 36% body fat and I would like you under 33% (under 30 would be even better).  You can schedule a nurse only appointment in 1 month to recheck this.    Mel Carlin MD  Internal Medicine    American Board of Obesity Medicine Diplomate      [facility]    If you have any questions regarding to your visit please contact your care team:     Team Pink:   Clinic Hours Telephone Number   Internal Medicine:  Dr. Mel Blanton NP 7am-7pm  Monday - Thursday   7am-5pm  Fridays  [phone]  (Appointment scheduling available 24/7)   Urgent Care - [place] and [facility] - 11am-9pm Monday-Friday Saturday-Sunday- 9am-5pm   [place] - 5pm-9pm Monday-Friday Saturday-Sunday- 9am-5pm  [phone] - [place]  [phone] - [place]       What options do I have for a visit other than an office visit? We offer electronic visits (e-visits) and telephone visits, when medically appropriate.  Please check with your medical insurance to see if these types of visits are covered, as you will be responsible for any charges that are not paid by your insurance.      You can use hereO (secure electronic communication) to access to your chart, send your primary care provider a message, or make an appointment. Ask a team member how to get started.     For a price quote for your services, please call our Consumer Price Line at [phone] or our Imaging Cost estimation line at [phone] (for imaging tests).    Gracie Cheng, CMA

## 2019-01-15 LAB — DHEA-S SERPL-MCNC: 303 UG/DL (ref 35–430)

## 2019-01-28 ENCOUNTER — OFFICE VISIT (OUTPATIENT)
Dept: FAMILY MEDICINE | Facility: CLINIC | Age: 29
End: 2019-01-28
Payer: COMMERCIAL

## 2019-01-28 VITALS
HEART RATE: 82 BPM | DIASTOLIC BLOOD PRESSURE: 65 MMHG | WEIGHT: 152 LBS | SYSTOLIC BLOOD PRESSURE: 99 MMHG | TEMPERATURE: 99 F | OXYGEN SATURATION: 100 % | BODY MASS INDEX: 24.91 KG/M2

## 2019-01-28 DIAGNOSIS — J40 BRONCHITIS: ICD-10-CM

## 2019-01-28 DIAGNOSIS — N91.2 AMENORRHEA: ICD-10-CM

## 2019-01-28 DIAGNOSIS — K52.9 GASTROENTERITIS: ICD-10-CM

## 2019-01-28 DIAGNOSIS — J98.01 BRONCHOSPASM: Primary | ICD-10-CM

## 2019-01-28 PROCEDURE — 99214 OFFICE O/P EST MOD 30 MIN: CPT | Performed by: NURSE PRACTITIONER

## 2019-01-28 RX ORDER — ALBUTEROL SULFATE 90 UG/1
2 AEROSOL, METERED RESPIRATORY (INHALATION) EVERY 6 HOURS
Qty: 1 INHALER | Refills: 0 | Status: SHIPPED | OUTPATIENT
Start: 2019-01-28 | End: 2020-01-28

## 2019-01-28 RX ORDER — ONDANSETRON 4 MG/1
4 TABLET, ORALLY DISINTEGRATING ORAL EVERY 8 HOURS PRN
Qty: 20 TABLET | Refills: 0 | Status: SHIPPED | OUTPATIENT
Start: 2019-01-28 | End: 2019-02-04

## 2019-01-28 ASSESSMENT — PAIN SCALES - GENERAL: PAINLEVEL: MILD PAIN (3)

## 2019-01-28 NOTE — PROGRESS NOTES
SUBJECTIVE:   Shelley Downs is a 28 year old female who presents to clinic today for the following health issues:      ED/UC Followup:    Facility:  [facility]  Date of visit: 1/23/19  Reason for visit: Abd. pain  Current Status: better, but still has mild discomfort.     She was seen at [facility] 5 days ago for 2 day history of abdominal pain, LLQ and radiating to back with N/V  Normal CT abd/pelvis  Normal WBC  She has been taking Zofran which has been helpful  Pain is improving. Feels much better today than previous  Nausea daily but vomiting resolved day after ED visit  Denies black or bloody stools  Loose stool today. Took laxative last night    She is on OCPs  Does not take 4th week to induce a period  Last period in March 2018    Dry cough for 1-2 months  Cough is improving  Coughing spells will cause SOB  Nonsmoker  No family history of asthma  Cough brought on by cold air  Denies SOB with exertion unless outside      Problem list and histories reviewed & adjusted, as indicated.  Additional history: as documented    Patient Active Problem List   Diagnosis     Anxiety     Past Surgical History:   Procedure Laterality Date     LASER SURGERY OF EYE  2014       Social History     Tobacco Use     Smoking status: Former Smoker     Smokeless tobacco: Never Used   Substance Use Topics     Alcohol use: Yes     Comment: Occas     History reviewed. No pertinent family history.        Reviewed and updated as needed this visit by clinical staff  Tobacco  Allergies  Meds  Med Hx  Surg Hx  Fam Hx  Soc Hx      Reviewed and updated as needed this visit by Provider         ROS:  Constitutional, HEENT, cardiovascular, pulmonary, gi and gu systems are negative, except as otherwise noted.    OBJECTIVE:     BP 99/65 (BP Location: Right arm, Patient Position: Chair, Cuff Size: Adult Regular)   Pulse 82   Temp 99  F (37.2  C) (Oral)   Wt 68.9 kg (152 lb)   LMP 01/21/2019   SpO2 100%   Breastfeeding? No   BMI 24.91 kg/m     Body mass index is 24.91 kg/m .  GENERAL: healthy, alert and no distress  HENT: ear canals and TM's normal, nose and mouth without ulcers or lesions  NECK: no adenopathy, no asymmetry, masses, or scars and thyroid normal to palpation  RESP: lungs clear to auscultation - no rales, rhonchi or wheezes  CV: regular rate and rhythm, normal S1 S2, no S3 or S4, no murmur, click or rub, no peripheral edema and peripheral pulses strong  ABDOMEN: soft, slight tenderness in RLQ and LLQ without guarding, no hepatosplenomegaly, no masses and bowel sounds normal    Diagnostic Test Results:  none     ASSESSMENT/PLAN:       ICD-10-CM    1. Bronchospasm J98.01 albuterol (PROAIR HFA/PROVENTIL HFA/VENTOLIN HFA) 108 (90 Base) MCG/ACT inhaler   2. Gastroenteritis K52.9 ondansetron (ZOFRAN-ODT) 4 MG ODT tab   3. Bronchitis J40    4. Amenorrhea N91.2      Outside records reviewed  Symptoms improving  Normal imaging  Can continue Zofran PRN, hydration and recommend bland diet until symptoms resolve  Suspect bronchitis which is improving, though slowly. Respiratory exam unremarkable. I do not think antibiotics are indicated as symptoms are improving, but will write for inhaler to help with bronchospasm. If needing refill this season, will get spirometry  Amenorrhea from taking OCPs consecutively. Recommend inducing a period every 3 months    NYDIA Ventura [facility]

## 2019-12-04 DIAGNOSIS — Z00.00 ROUTINE GENERAL MEDICAL EXAMINATION AT A HEALTH CARE FACILITY: ICD-10-CM

## 2019-12-04 RX ORDER — NORETHINDRONE ACETATE AND ETHINYL ESTRADIOL .02; 1 MG/1; MG/1
1 TABLET ORAL DAILY
Qty: 84 TABLET | Refills: 0 | Status: SHIPPED | OUTPATIENT
Start: 2019-12-04

## 2019-12-04 NOTE — TELEPHONE ENCOUNTER
"Requested Prescriptions   Pending Prescriptions Disp Refills     norethindrone-ethinyl estradiol (JUNEL 1/20) 1-20 MG-MCG tablet 84 tablet 3     Sig: Take 1 tablet by mouth daily   Last Written Prescription Date:  11-26-18  Last Fill Quantity: 84,  # refills: 3   Last office visit: 1/28/2019 with prescribing provider:  12-12-18   Future Office Visit:        Contraceptives Protocol Passed - 12/4/2019  9:08 AM        Passed - Patient is not a current smoker if age is 35 or older        Passed - Recent (12 mo) or future (30 days) visit within the authorizing provider's specialty     Patient has had an office visit with the authorizing provider or a provider within the authorizing providers department within the previous 12 mos or has a future within next 30 days. See \"Patient Info\" tab in inbasket, or \"Choose Columns\" in Meds & Orders section of the refill encounter.              Passed - Medication is active on med list        Passed - No active pregnancy on record        Passed - No positive pregnancy test in past 12 months          "

## 2019-12-04 NOTE — TELEPHONE ENCOUNTER
One time supply given. Patient is due to be seen before refill runs out. Note to pharmacy to inform the patient.     Sonali Elliott RN

## 2024-06-28 NOTE — MR AVS SNAPSHOT
After Visit Summary   11/15/2018    Shelley Downs    MRN: [id number]           Patient Information     Date Of Birth          1990        Visit Information        Provider Department      11/15/2018 4:00 PM María Sherman MD [facility]        Care Instructions    After care instructions:  Keep wound clean and dry for the next 24-48 hours  Signs of infection discussed today  Apply anti-bacterial ointment for 5- 7 days, 2-3X per day.  May return to work as long as wound is kept clean and dry  Discussed the probability of scarring  Active range of motion exercises encouraged.      Follow up in one week            Follow-ups after your visit        Follow-up notes from your care team     Return in about 1 week (around 11/22/2018), or if symptoms worsen or fail to improve.      Your next 10 appointments already scheduled     Nov 16, 2018 11:00 AM CST   PHYSICAL with Lauren Anneliese Engelmann, MD   [facility]    [address] [place] [address] [id number] [phone]              Who to contact     If you have questions or need follow up information about today's clinic visit or your schedule please contact [facility] directly at [phone].  Normal or non-critical lab and imaging results will be communicated to you by MyChart, letter or phone within 4 business days after the clinic has received the results. If you do not hear from us within 7 days, please contact the clinic through [employer] or phone. If you have a critical or abnormal lab result, we will notify you by phone as soon as possible.  Submit refill requests through [employer] or call your pharmacy and they will forward the refill request to us. Please allow 3 business days for your refill to be completed.          Additional Information About Your Visit        [employer] Information     [employer] lets you send messages  PATIENT RESCHEDULED CPE    F/T CPE 08-23-24 [facility]     "to your doctor, view your test results, renew your prescriptions, schedule appointments and more. To sign up, go to [email or web address] . Click on \"Log in\" on the left side of the screen, which will take you to the Welcome page. Then click on \"Sign up Now\" on the right side of the page.     You will be asked to enter the access code listed below, as well as some personal information. Please follow the directions to create your username and password.     Your access code is: [email or web address]  Expires: 2019  9:26 AM     Your access code will  in 90 days. If you need help or a new code, please call your [facility] clinic or [phone].        Care EveryWhere ID     This is your Care EveryWhere ID. This could be used by other organizations to access your [facility] medical records  [id number]        Your Vitals Were     Pulse Temperature Pulse Oximetry Breastfeeding? BMI (Body Mass Index)       76 97.3  F (36.3  C) (Oral) 100% No 24.58 kg/m2        Blood Pressure from Last 3 Encounters:   11/15/18 102/64   18 124/70    Weight from Last 3 Encounters:   11/15/18 150 lb (68 kg)   18 148 lb (67.1 kg)              Today, you had the following     No orders found for display       Primary Care Provider Fax #    Physician No Ref-Primary [phone]       No address on file        Equal Access to Services     MICHAEL ESPINOZA : Hadii anand ku hadasho Soeddali, waaxda luqadaha, qaybta kaalmada adeegyada, emery dai . So [facility] [phone].    ATENCIÓN: Si habla español, tiene a duckworth disposición servicios gratuitos de asistencia lingüística. Llame al [phone].    We comply with applicable federal civil rights laws and [place] laws. We do not discriminate on the basis of race, color, national origin, age, disability, sex, sexual orientation, or gender identity.            Thank you!     Thank you for choosing [facility]  for your care. Our goal is always to " provide you with excellent care. Hearing back from our patients is one way we can continue to improve our services. Please take a few minutes to complete the written survey that you may receive in the mail after your visit with us. Thank you!             Your Updated Medication List - Protect others around you: Learn how to safely use, store and throw away your medicines at www.disposemymeds.org.      Notice  As of 11/15/2018  4:43 PM    You have not been prescribed any medications.